# Patient Record
Sex: FEMALE | Race: BLACK OR AFRICAN AMERICAN | NOT HISPANIC OR LATINO | Employment: FULL TIME | URBAN - METROPOLITAN AREA
[De-identification: names, ages, dates, MRNs, and addresses within clinical notes are randomized per-mention and may not be internally consistent; named-entity substitution may affect disease eponyms.]

---

## 2024-01-22 ENCOUNTER — HOSPITAL ENCOUNTER (EMERGENCY)
Facility: HOSPITAL | Age: 34
Discharge: HOME | End: 2024-01-22
Payer: COMMERCIAL

## 2024-01-22 VITALS
BODY MASS INDEX: 26.8 KG/M2 | WEIGHT: 157 LBS | RESPIRATION RATE: 17 BRPM | TEMPERATURE: 97.6 F | HEIGHT: 64 IN | SYSTOLIC BLOOD PRESSURE: 115 MMHG | HEART RATE: 65 BPM | DIASTOLIC BLOOD PRESSURE: 81 MMHG | OXYGEN SATURATION: 100 %

## 2024-01-22 DIAGNOSIS — R22.1 NECK MASS: Primary | ICD-10-CM

## 2024-01-22 PROCEDURE — 99283 EMERGENCY DEPT VISIT LOW MDM: CPT

## 2024-01-22 ASSESSMENT — COLUMBIA-SUICIDE SEVERITY RATING SCALE - C-SSRS
1. IN THE PAST MONTH, HAVE YOU WISHED YOU WERE DEAD OR WISHED YOU COULD GO TO SLEEP AND NOT WAKE UP?: NO
2. HAVE YOU ACTUALLY HAD ANY THOUGHTS OF KILLING YOURSELF?: NO
6. HAVE YOU EVER DONE ANYTHING, STARTED TO DO ANYTHING, OR PREPARED TO DO ANYTHING TO END YOUR LIFE?: NO

## 2024-01-22 ASSESSMENT — PAIN SCALES - GENERAL: PAINLEVEL_OUTOF10: 2

## 2024-01-22 ASSESSMENT — PAIN DESCRIPTION - LOCATION: LOCATION: NECK

## 2024-01-22 ASSESSMENT — PAIN - FUNCTIONAL ASSESSMENT: PAIN_FUNCTIONAL_ASSESSMENT: 0-10

## 2024-01-22 NOTE — ED TRIAGE NOTES
C/O LUMP ON LEFT SIDE OF NECK, ONSET 1 MONTH, PT WAS PRESCRIBED PREDNISONE AND AMOXICILLIN ON JAN 9TH, PT FINISHED PREDNISONE, BUT STATES SHE STOPPED TAKING THE AMOXICILLIN AFTER 7 DAYS

## 2024-01-22 NOTE — DISCHARGE INSTRUCTIONS
Please follow with ENT for a definitive diagnosis of the mass. Call Dr Dominique. A  may also contact you.

## 2024-01-23 NOTE — ED PROVIDER NOTES
HPI   Chief Complaint   Patient presents with    Mass       33-year-old female complains of painless mass on the left side of her neck states she was recently on antibiotics and steroids she did have discomfort after stretching however after taking these meds she continues to have tenderness and swelling.  Nothing makes her better or worse.  No problems swallowing or breathing.                          No data recorded                Patient History   Past Medical History:   Diagnosis Date    Abnormal glucose complicating pregnancy 03/05/2020    Glucose intolerance of pregnancy    Chlamydial infection, unspecified 08/01/2016    Chlamydia infection    Encounter for initial prescription of contraceptive pills 07/31/2018    Encounter for initial prescription of contraceptive pills    Encounter for pregnancy test, result positive 10/14/2019    Positive pregnancy test    Encounter for pregnancy test, result unknown 10/14/2019    Visit for confirmation of pregnancy test result with physical exam    Encounter for screening for infections with a predominantly sexual mode of transmission     Screening for STDs (sexually transmitted diseases)    Encounter for screening for infections with a predominantly sexual mode of transmission 08/17/2016    Routine screening for STI (sexually transmitted infection)    Encounter for screening for malignant neoplasm of cervix 05/24/2017    Encounter for Papanicolaou smear for cervical cancer screening    Encounter for supervision of normal pregnancy, unspecified, second trimester 02/18/2020    Second trimester pregnancy    Encounter for supervision of normal pregnancy, unspecified, third trimester 05/05/2020    Third trimester pregnancy    Maternal care for other (suspected) fetal abnormality and damage, fetal cardiac anomalies, not applicable or unspecified 05/11/2018    Abnormal fetal echocardiogram affecting antepartum care of mother    Other conditions influencing health status  02/18/2020    History of pregnancy    Other conditions influencing health status 05/11/2018    Nuchal fold thickening determined by ultrasound    Other specified health status 05/11/2018    No pertinent past medical history    Pelvic and perineal pain 07/22/2016    Pelvic pain in female    Personal history of other infectious and parasitic diseases 08/01/2016    History of trichomonal vaginitis    Vomiting of pregnancy, unspecified 01/31/2018    Nausea and vomiting of pregnancy, antepartum     No past surgical history on file.  No family history on file.  Social History     Tobacco Use    Smoking status: Not on file    Smokeless tobacco: Not on file   Substance Use Topics    Alcohol use: Not on file    Drug use: Not on file       Physical Exam   ED Triage Vitals [01/22/24 0948]   Temp Heart Rate Respirations BP   36.4 °C (97.6 °F) 65 17 115/81      Pulse Ox Temp src Heart Rate Source Patient Position   100 % -- -- --      BP Location FiO2 (%)     -- --       Physical Exam  Vitals and nursing note reviewed.   Constitutional:       General: She is not in acute distress.     Appearance: Normal appearance. She is normal weight. She is not ill-appearing, toxic-appearing or diaphoretic.   HENT:      Head: Normocephalic and atraumatic.      Right Ear: External ear normal.      Left Ear: External ear normal.      Nose: Nose normal.      Mouth/Throat:      Mouth: Mucous membranes are moist.   Eyes:      Extraocular Movements: Extraocular movements intact.      Conjunctiva/sclera: Conjunctivae normal.      Pupils: Pupils are equal, round, and reactive to light.   Cardiovascular:      Rate and Rhythm: Normal rate and regular rhythm.   Pulmonary:      Effort: Pulmonary effort is normal. No respiratory distress.      Breath sounds: No stridor.   Abdominal:      General: There is no distension.   Musculoskeletal:         General: No swelling, tenderness, deformity or signs of injury.      Cervical back: Normal range of motion.       Comments: Mobile enlarged mass to the left sternocleidomastoid region no enlarged lymph nodes appreciated.   Skin:     General: Skin is warm.      Capillary Refill: Capillary refill takes less than 2 seconds.      Findings: No rash.   Neurological:      General: No focal deficit present.      Mental Status: She is alert and oriented to person, place, and time. Mental status is at baseline.   Psychiatric:         Mood and Affect: Mood normal.         Behavior: Behavior normal.         Thought Content: Thought content normal.         Judgment: Judgment normal.         ED Course & MDM   Diagnoses as of 01/22/24 1958   Neck mass       Medical Decision Making  Differentials lymph node versus spasm versus mass versus lipoma recommend patient follow-up with primary care return for worse concerning symptoms referred to ENT for further evaluation and biopsy        Procedure  Procedures     Henry Baker PA-C  01/22/24 2000

## 2024-01-25 PROBLEM — R22.1 NECK MASS: Status: ACTIVE | Noted: 2024-01-25

## 2024-01-25 NOTE — PROGRESS NOTES
Chief Complaint: Left neck mass    Referring Provider: Henry Baker PA-C ED    33-year-old female complains of painless mass on the left side of her neck states she was recently on antibiotics and steroids she did have discomfort after stretching however after taking these meds she continues to have tenderness and swelling.  Referred to our office for surgical evaluation of left neck mass.     Location: ***  Quality: ***  Severity: ***  Duration: ***  Timing: ***  Context: ***  Modifying factors:  ***  Associated signs and symptoms: ***    Past Medical History  She has a past medical history of Abnormal glucose complicating pregnancy (03/05/2020), Chlamydial infection, unspecified (08/01/2016), Encounter for initial prescription of contraceptive pills (07/31/2018), Encounter for pregnancy test, result positive (10/14/2019), Encounter for pregnancy test, result unknown (10/14/2019), Encounter for screening for infections with a predominantly sexual mode of transmission, Encounter for screening for infections with a predominantly sexual mode of transmission (08/17/2016), Encounter for screening for malignant neoplasm of cervix (05/24/2017), Encounter for supervision of normal pregnancy, unspecified, second trimester (02/18/2020), Encounter for supervision of normal pregnancy, unspecified, third trimester (05/05/2020), Maternal care for other (suspected) fetal abnormality and damage, fetal cardiac anomalies, not applicable or unspecified (05/11/2018), Other conditions influencing health status (02/18/2020), Other conditions influencing health status (05/11/2018), Other specified health status (05/11/2018), Pelvic and perineal pain (07/22/2016), Personal history of other infectious and parasitic diseases (08/01/2016), and Vomiting of pregnancy, unspecified (01/31/2018).    Surgical History  She has no past surgical history on file.     Social History  She has no history on file for tobacco use, alcohol use, and drug  use.    Family History  No family history on file.     Allergies  Shellfish containing products    Past, family, and social history obtained but not pertinent to current problem unless documented above.    All other systems have been reviewed and are negative for complaint unless documented above.    Physical Exam:  General: Well-developed and well-nourished in appearance.  Skin: No rashes or concerning lesions on the visible portions of the skin.  Eyes: Extraocular movements intact. Visual fields grossly normal.  Ears: Pinna are normal in shape and position. External canals are patent.  Nose: Dorsum is midline. Septum is midline and turbinates are normal on anterior  rhinoscopy.  Oral Cavity/Oropharynx: Dentition is intact. Mucous membranes moist. No masses or  lesions. Tonsils are symmetric and non-obstructive.  Neck: Midline trachea without masses or lesions. Thyroid is normal in size.  Lymphatics: No palpable cervical lymphadenopathy  Respiratory: No respiratory distress. Quiet breathing without stertor or stridor.  Cardiovascular: Regular rate and rhythm. Warm extremities with equal pulses.  Psych: Normal mood and affect. Judgement and insight appropriate.  Neuro: Alert and oriented. CN II-XII grossly intact. No focal deficits.  Musculoskeletal: Gait intact. Moves all extremities well without apparent deformities.    Assessment: ***    Plan: ***

## 2024-01-29 ENCOUNTER — APPOINTMENT (OUTPATIENT)
Dept: OTOLARYNGOLOGY | Facility: CLINIC | Age: 34
End: 2024-01-29
Payer: COMMERCIAL

## 2024-10-25 ENCOUNTER — LAB (OUTPATIENT)
Dept: LAB | Facility: LAB | Age: 34
End: 2024-10-25
Payer: COMMERCIAL

## 2024-10-25 ENCOUNTER — OFFICE VISIT (OUTPATIENT)
Dept: PRIMARY CARE | Facility: CLINIC | Age: 34
End: 2024-10-25
Payer: COMMERCIAL

## 2024-10-25 VITALS
SYSTOLIC BLOOD PRESSURE: 116 MMHG | TEMPERATURE: 97.8 F | BODY MASS INDEX: 27.62 KG/M2 | HEIGHT: 64 IN | DIASTOLIC BLOOD PRESSURE: 77 MMHG | HEART RATE: 67 BPM | WEIGHT: 161.8 LBS

## 2024-10-25 DIAGNOSIS — Z76.89 ENCOUNTER TO ESTABLISH CARE WITH NEW DOCTOR: ICD-10-CM

## 2024-10-25 DIAGNOSIS — R22.1 NECK MASS: ICD-10-CM

## 2024-10-25 DIAGNOSIS — Z00.00 LABORATORY EXAMINATION ORDERED AS PART OF A ROUTINE GENERAL MEDICAL EXAMINATION: ICD-10-CM

## 2024-10-25 DIAGNOSIS — R22.1 NECK MASS: Primary | ICD-10-CM

## 2024-10-25 LAB
ALBUMIN SERPL BCP-MCNC: 4.2 G/DL (ref 3.4–5)
ALP SERPL-CCNC: 39 U/L (ref 33–110)
ALT SERPL W P-5'-P-CCNC: 19 U/L (ref 7–45)
ANION GAP SERPL CALC-SCNC: 11 MMOL/L (ref 10–20)
AST SERPL W P-5'-P-CCNC: 20 U/L (ref 9–39)
BASOPHILS # BLD AUTO: 0.04 X10*3/UL (ref 0–0.1)
BASOPHILS NFR BLD AUTO: 0.4 %
BILIRUB SERPL-MCNC: 0.6 MG/DL (ref 0–1.2)
BUN SERPL-MCNC: 11 MG/DL (ref 6–23)
CALCIUM SERPL-MCNC: 9.1 MG/DL (ref 8.6–10.3)
CHLORIDE SERPL-SCNC: 105 MMOL/L (ref 98–107)
CHOLEST SERPL-MCNC: 151 MG/DL (ref 0–199)
CHOLESTEROL/HDL RATIO: 2.6
CO2 SERPL-SCNC: 27 MMOL/L (ref 21–32)
CREAT SERPL-MCNC: 0.84 MG/DL (ref 0.5–1.05)
EGFRCR SERPLBLD CKD-EPI 2021: >90 ML/MIN/1.73M*2
EOSINOPHIL # BLD AUTO: 0.64 X10*3/UL (ref 0–0.7)
EOSINOPHIL NFR BLD AUTO: 5.6 %
ERYTHROCYTE [DISTWIDTH] IN BLOOD BY AUTOMATED COUNT: 13.7 % (ref 11.5–14.5)
GLUCOSE SERPL-MCNC: 84 MG/DL (ref 74–99)
HCT VFR BLD AUTO: 40.4 % (ref 36–46)
HDLC SERPL-MCNC: 59.1 MG/DL
HGB BLD-MCNC: 12.5 G/DL (ref 12–16)
IMM GRANULOCYTES # BLD AUTO: 0.03 X10*3/UL (ref 0–0.7)
IMM GRANULOCYTES NFR BLD AUTO: 0.3 % (ref 0–0.9)
LDLC SERPL CALC-MCNC: 72 MG/DL
LYMPHOCYTES # BLD AUTO: 2.16 X10*3/UL (ref 1.2–4.8)
LYMPHOCYTES NFR BLD AUTO: 19 %
MCH RBC QN AUTO: 26.4 PG (ref 26–34)
MCHC RBC AUTO-ENTMCNC: 30.9 G/DL (ref 32–36)
MCV RBC AUTO: 85 FL (ref 80–100)
MONOCYTES # BLD AUTO: 0.84 X10*3/UL (ref 0.1–1)
MONOCYTES NFR BLD AUTO: 7.4 %
NEUTROPHILS # BLD AUTO: 7.65 X10*3/UL (ref 1.2–7.7)
NEUTROPHILS NFR BLD AUTO: 67.3 %
NON HDL CHOLESTEROL: 92 MG/DL (ref 0–149)
NRBC BLD-RTO: 0 /100 WBCS (ref 0–0)
PLATELET # BLD AUTO: 289 X10*3/UL (ref 150–450)
POTASSIUM SERPL-SCNC: 4.3 MMOL/L (ref 3.5–5.3)
PROT SERPL-MCNC: 7.3 G/DL (ref 6.4–8.2)
RBC # BLD AUTO: 4.73 X10*6/UL (ref 4–5.2)
SODIUM SERPL-SCNC: 139 MMOL/L (ref 136–145)
TRIGL SERPL-MCNC: 100 MG/DL (ref 0–149)
VLDL: 20 MG/DL (ref 0–40)
WBC # BLD AUTO: 11.4 X10*3/UL (ref 4.4–11.3)

## 2024-10-25 PROCEDURE — 99204 OFFICE O/P NEW MOD 45 MIN: CPT | Performed by: STUDENT IN AN ORGANIZED HEALTH CARE EDUCATION/TRAINING PROGRAM

## 2024-10-25 PROCEDURE — 1036F TOBACCO NON-USER: CPT | Performed by: STUDENT IN AN ORGANIZED HEALTH CARE EDUCATION/TRAINING PROGRAM

## 2024-10-25 PROCEDURE — 85025 COMPLETE CBC W/AUTO DIFF WBC: CPT

## 2024-10-25 PROCEDURE — 80053 COMPREHEN METABOLIC PANEL: CPT

## 2024-10-25 PROCEDURE — 3008F BODY MASS INDEX DOCD: CPT | Performed by: STUDENT IN AN ORGANIZED HEALTH CARE EDUCATION/TRAINING PROGRAM

## 2024-10-25 PROCEDURE — 80061 LIPID PANEL: CPT

## 2024-10-25 PROCEDURE — 36415 COLL VENOUS BLD VENIPUNCTURE: CPT

## 2024-10-25 RX ORDER — BISMUTH SUBSALICYLATE 262 MG
1 TABLET,CHEWABLE ORAL DAILY
COMMUNITY

## 2024-10-25 ASSESSMENT — ENCOUNTER SYMPTOMS
LOSS OF SENSATION IN FEET: 0
DIZZINESS: 0
CONSTIPATION: 0
CHILLS: 0
NAUSEA: 0
OCCASIONAL FEELINGS OF UNSTEADINESS: 0
DIFFICULTY URINATING: 0
COUGH: 0
HEADACHES: 0
VOMITING: 0
WHEEZING: 0
ADENOPATHY: 0
FATIGUE: 0
ABDOMINAL PAIN: 0
COLOR CHANGE: 0
SHORTNESS OF BREATH: 0
DEPRESSION: 0
FEVER: 0
DIARRHEA: 0

## 2024-10-25 NOTE — PROGRESS NOTES
"  [facility] - [facility]  [address], [address]  [place] [address]    Subjective     Patient ID: Harini Pack is a 34 y.o. female who presents for  Establish Care (Lump on eft side of neck  and in the back of her head)     Lumps on head and neck. On the left side. For 8 months duration. Dont hurt. Not hard, squishy feeling. Some itching. Did go to the ER for this back then too. Was given a prescription and finished it. Nothing changed. Is not growing.     Was told by ER it was a lipoma. They did not do labs.        Review of Systems   Constitutional:  Negative for chills, fatigue and fever.   HENT:  Negative for congestion.    Eyes:  Negative for visual disturbance.   Respiratory:  Negative for cough, shortness of breath and wheezing.    Cardiovascular:  Negative for chest pain.   Gastrointestinal:  Negative for abdominal pain, constipation, diarrhea, nausea and vomiting.   Genitourinary:  Negative for difficulty urinating.   Musculoskeletal:  Negative for gait problem.   Skin:  Negative for color change.   Neurological:  Negative for dizziness, syncope and headaches.   Hematological:  Negative for adenopathy.       Social History     Tobacco Use    Smoking status: Never    Smokeless tobacco: Never   Substance Use Topics    Alcohol use: Yes     Alcohol/week: 2.0 standard drinks of alcohol     Types: 2 Glasses of wine per week    Drug use: Never     Family History   Problem Relation Name Age of Onset    Other (urethral cancer) Mother      Diabetes type II Father      Other (lipoma) Father       Allergies   Allergen Reactions    Shellfish Containing Products Unknown       /77 (BP Location: Right arm, Patient Position: Sitting, BP Cuff Size: Adult)   Pulse 67   Temp 36.6 °C (97.8 °F) (Temporal)   Ht 1.626 m (5' 4\")   Wt 73.4 kg (161 lb 12.8 oz)   BMI 27.77 kg/m²      Objective   Physical Exam  Vitals and nursing note reviewed.   Constitutional:       Appearance: Normal appearance. "   Eyes:      Extraocular Movements: Extraocular movements intact.      Conjunctiva/sclera: Conjunctivae normal.      Pupils: Pupils are equal, round, and reactive to light.   Neck:      Comments: One lump on the posterior-lateral aspect of the neck on the left side, soft but indurated, 2 cm in diameter. No overlying skin changes. Similar finding on the posterior scalp on the left, soft, 1.5 cm in diameter. No overlying hair or skin changes. Cervical anterior chain, mandibular, and submandibular were without lymphadenopathy. Supraclavicular without lymphadenopathy. Axillary without lymphadenopathy.  Cardiovascular:      Rate and Rhythm: Normal rate and regular rhythm.   Pulmonary:      Effort: Pulmonary effort is normal.      Breath sounds: Normal breath sounds.   Abdominal:      General: Abdomen is flat. Bowel sounds are normal.      Palpations: Abdomen is soft.   Musculoskeletal:         General: Normal range of motion.      Cervical back: Normal range of motion and neck supple.   Skin:     General: Skin is warm and dry.      Capillary Refill: Capillary refill takes less than 2 seconds.   Neurological:      General: No focal deficit present.      Mental Status: She is alert and oriented to person, place, and time.   Psychiatric:         Mood and Affect: Mood normal.         Behavior: Behavior normal.         Thought Content: Thought content normal.           Labs:   Lab Results   Component Value Date    WBC 10.2 04/01/2021    HGB 12.3 04/01/2021    HCT 38.4 04/01/2021     04/01/2021    INR 1.2 (H) 07/22/2018     Lab Results   Component Value Date     04/01/2021    K 3.8 04/01/2021     04/01/2021    BUN 8 04/01/2021    CREATININE 0.72 04/01/2021    GLUCOSE 95 04/01/2021    CALCIUM 8.8 04/01/2021    PROT 6.3 (L) 05/31/2018    BILITOT 0.3 05/31/2018    ALKPHOS 43 05/31/2018    AST 17 05/31/2018    ALT 15 05/31/2018     Imaging/Testing: Electrocardiogram 12 Lead  Normal sinus rhythm  Normal ECG  No  previous ECGs available  Confirmed by YVON MOSES MD ([id number]) on 4/6/2021 7:12:37 AM    Assessment/Plan   Problem List Items Addressed This Visit       Neck mass - Primary    Relevant Orders    CBC and Auto Differential    Referral to ENT    Follow Up In Primary Care - Established     Other Visit Diagnoses       Encounter to establish care with new doctor        Laboratory examination ordered as part of a routine general medical examination        Relevant Orders    Comprehensive Metabolic Panel    Lipid Panel          Neck mass- acute issue, uncontrolled, initial encounter. Lipoma versus lymphatic. Not likely infection.will get CBC, if abnormal will get smear + US neck. Did refer to ENT as well.    As part of today's Preventative Visit, an age and gender-appropriate history and physical was performed, as documented below. Counseling and anticipatory guidance were performed, and risk factor reduction interventions (including [place] Preventative Services Task Force recommended screening tests) were utilized/ordered as outlined in the above Assessment and Plan. All patient medications were reviewed, and refilled if necessary. Patient and I discussed diet/nutrition, lifestyle modifications, safety, medication indications and side effects, and health goals.    Patient and I discussed diet/nutrition, lifestyle modifications, safety, medication indications and side effects, and health goals.    current treatment plan is effective, no change in therapy, orders and follow up as documented in EMR, repeat labs ordered prior to next appointment     Return visit in 3 month.           ERYN HOLLIDAY MD, [facility]  Department of Family Medicine of [facility] - Primary Care

## 2024-10-27 NOTE — PROGRESS NOTES
Subjective   Patient ID: Harini Pack is a 34 y.o. female who presents for No chief complaint on file..  HPI  This 34-year-old female was seen by primary care for complaints of left neck and occipital neck lumps.  That assessment was recently done and according to history the left-sided lesions were noted about 8 months ago.  There is no associated pain and has been no signs of infection.  She was initially seen by an emergency room visit at that time and was told that this was a lipoma.  According to records the posterior lateral aspect of the neck on the left revealed a soft nonindurated 2 cm lesion.  No signs of discomfort were noted to palpation.  Posterior scalp reveal a soft 1.5 cm lesion with.  No other abnormalities in the neck were noted.  No imaging studies have been done  Review of Systems  A 12 point ROS has been reviewed and are negative for complaint except what is stated in the history of present illness and/or past medical history as noted in the EMR    Past Medical History:   Diagnosis Date    Abnormal glucose complicating pregnancy ([facility]) 03/05/2020    Glucose intolerance of pregnancy    Chlamydial infection, unspecified 08/01/2016    Chlamydia infection    Encounter for initial prescription of contraceptive pills 07/31/2018    Encounter for initial prescription of contraceptive pills    Encounter for pregnancy test, result positive ([facility]) 10/14/2019    Positive pregnancy test    Encounter for pregnancy test, result unknown 10/14/2019    Visit for confirmation of pregnancy test result with physical exam    Encounter for screening for infections with a predominantly sexual mode of transmission     Screening for STDs (sexually transmitted diseases)    Encounter for screening for infections with a predominantly sexual mode of transmission 08/17/2016    Routine screening for STI (sexually transmitted infection)    Encounter for screening for malignant neoplasm of cervix 05/24/2017    Encounter  for Papanicolaou smear for cervical cancer screening    Encounter for supervision of normal pregnancy, unspecified, second trimester 02/18/2020    Second trimester pregnancy    Encounter for supervision of normal pregnancy, unspecified, third trimester 05/05/2020    Third trimester pregnancy    Maternal care for other (suspected) fetal abnormality and damage, fetal cardiac anomalies, not applicable or unspecified 05/11/2018    Abnormal fetal echocardiogram affecting antepartum care of mother    Other conditions influencing health status 02/18/2020    History of pregnancy    Other conditions influencing health status 05/11/2018    Nuchal fold thickening determined by ultrasound    Other specified health status 05/11/2018    No pertinent past medical history    Pelvic and perineal pain 07/22/2016    Pelvic pain in female    Personal history of other infectious and parasitic diseases 08/01/2016    History of trichomonal vaginitis    Vomiting of pregnancy, unspecified 01/31/2018    Nausea and vomiting of pregnancy, antepartum          Current Outpatient Medications:     multivitamin tablet, Take 1 tablet by mouth once daily., Disp: , Rfl:      No past surgical history on file.     Family History   Problem Relation Name Age of Onset    Other (urethral cancer) Mother      Diabetes type II Father      Other (lipoma) Father         Social History     Tobacco Use    Smoking status: Never    Smokeless tobacco: Never   Substance Use Topics    Alcohol use: Yes     Alcohol/week: 2.0 standard drinks of alcohol     Types: 2 Glasses of wine per week       Allergies   Allergen Reactions    Shellfish Containing Products Unknown       There were no vitals taken for this visit.    Objective   Physical Exam  Examination:    CONSTITUTIONAL: Alert, in no acute distress, normal pitch/clarity of voice, well-developed, well-nourished, cooperative.  HEAD/FACE: Normocephalic, atraumatic, no tenderness over the sinuses, facial strength and  movement symmetric.    SKIN: Good turgor, no rashes, no suspicious lesions, in the head and neck.    EYES: Both eyes have normal extraocular movements with no nystagmus, pupils are equal and reactive to light and accommodation, conjunctiva is clear.    EARS: Both ears are negative for external skin abnormalities, external auditory canals are without lesions or signs of inflammation, tympanic membranes are intact and are of normal color and texture, no effusions are seen, light reflexes normal, no mastoid tenderness is noted to palpation, objective hearing is intact.    NOSE: No external skin lesions are noted, nares are patent, septum is intact and noninflamed, nasal turbinates are normal in appearance, sinuses are nontender to palpation bilaterally, no internal lesions or polyps are noted, no discharge is noted.    OROPHARYNX/ORAL CAVITY: Mucous membranes of the oropharynx and the oral cavity proper are without lesions or ulcerations, tongue mobility is normal and no lesions are noted, gingiva and alveolar mucosa is intact without lesions, oral mucosa is moist, muscular movement of the palate and gag reflex are normal.    NASOPHARYNX: Mucous membranes are noninflamed and no secretions or lesions are noted.    LARYNX: No mucosal inflammation or exudates are noted, arytenoids are normal in appearance and mobility, false vocal cords are without lesions as is the remainder of the supraglottic larynx, true vocal folds are mobile without inflammation or obstructions and no masses or lesions are noted in the endolarynx.    NECK: No lymphadenopathy is palpated, neck is supple with full range of motion, thyroid is without swelling or tenderness, trachea is midline, no neck masses are noted.    Lymphatics: No cervical adenopathy or supraclavicular adenopathy noted to palpation.    HEART/VASCULAR: No jugular venous distention is noted, carotid pulsations are intact with a regular rate and rhythm noted,    PULMONARY: Good air  movement with normal inspiratory/expiratory effort is noted, no audible wheezing is appreciated.    NEUROLOGIC: Alert and oriented, cranial nerves are grossly intact, gait is normal, sensation in the head and neck is intact,    PSYCH: oriented to person, place and time, normal mood and affect.    EXTREMITIES: No motor dysfunction of the upper and lower extremity is noted.  Assessment/Plan   {Assess/PlanSmartLinks:44474}         Farhan Casillas DMD, MD 10/27/24 11:16 AM

## 2024-10-28 ENCOUNTER — APPOINTMENT (OUTPATIENT)
Dept: OTOLARYNGOLOGY | Facility: CLINIC | Age: 34
End: 2024-10-28
Payer: COMMERCIAL

## 2024-10-28 DIAGNOSIS — R22.1 NECK MASS: Primary | ICD-10-CM

## 2024-11-11 ENCOUNTER — APPOINTMENT (OUTPATIENT)
Dept: OTOLARYNGOLOGY | Facility: CLINIC | Age: 34
End: 2024-11-11
Payer: COMMERCIAL

## 2024-12-05 ENCOUNTER — HOSPITAL ENCOUNTER (OUTPATIENT)
Dept: RADIOLOGY | Facility: HOSPITAL | Age: 34
Discharge: HOME | End: 2024-12-05
Payer: COMMERCIAL

## 2024-12-05 DIAGNOSIS — R22.1 NECK MASS: ICD-10-CM

## 2024-12-05 PROCEDURE — 76536 US EXAM OF HEAD AND NECK: CPT

## 2024-12-05 PROCEDURE — 76536 US EXAM OF HEAD AND NECK: CPT | Performed by: RADIOLOGY

## 2024-12-09 DIAGNOSIS — R22.1 NECK MASS: Primary | ICD-10-CM

## 2024-12-09 DIAGNOSIS — R22.0 HEAD MASS: ICD-10-CM

## 2024-12-18 ENCOUNTER — TELEPHONE (OUTPATIENT)
Dept: PRIMARY CARE | Facility: CLINIC | Age: 34
End: 2024-12-18
Payer: COMMERCIAL

## 2024-12-18 NOTE — TELEPHONE ENCOUNTER
I received a referral for a PEER TO PEER discussion for her CT head and CT neck for her neck mass. I called the phone number for her insurance [employer] at [phone]  and was directed to the confidential Peer to peer line. There was no answer, I left my call back number so they can reach me to conduct the peer to peer.

## 2024-12-20 ENCOUNTER — APPOINTMENT (OUTPATIENT)
Dept: PRIMARY CARE | Facility: CLINIC | Age: 34
End: 2024-12-20
Payer: COMMERCIAL

## 2024-12-23 ENCOUNTER — HOSPITAL ENCOUNTER (OUTPATIENT)
Dept: RADIOLOGY | Facility: CLINIC | Age: 34
Discharge: HOME | End: 2024-12-23
Payer: COMMERCIAL

## 2024-12-23 DIAGNOSIS — R22.1 NECK MASS: ICD-10-CM

## 2024-12-23 DIAGNOSIS — R22.0 HEAD MASS: ICD-10-CM

## 2024-12-23 PROCEDURE — 70491 CT SOFT TISSUE NECK W/DYE: CPT | Performed by: RADIOLOGY

## 2024-12-23 PROCEDURE — 70491 CT SOFT TISSUE NECK W/DYE: CPT

## 2024-12-23 PROCEDURE — 70460 CT HEAD/BRAIN W/DYE: CPT | Performed by: RADIOLOGY

## 2024-12-23 PROCEDURE — 70460 CT HEAD/BRAIN W/DYE: CPT

## 2024-12-23 PROCEDURE — 2550000001 HC RX 255 CONTRASTS: Performed by: STUDENT IN AN ORGANIZED HEALTH CARE EDUCATION/TRAINING PROGRAM

## 2025-01-28 ENCOUNTER — APPOINTMENT (OUTPATIENT)
Dept: PRIMARY CARE | Facility: CLINIC | Age: 35
End: 2025-01-28
Payer: COMMERCIAL

## 2025-01-28 VITALS
HEART RATE: 77 BPM | BODY MASS INDEX: 27.46 KG/M2 | OXYGEN SATURATION: 98 % | DIASTOLIC BLOOD PRESSURE: 75 MMHG | SYSTOLIC BLOOD PRESSURE: 111 MMHG | WEIGHT: 160 LBS

## 2025-01-28 DIAGNOSIS — D72.829 LEUKOCYTOSIS, UNSPECIFIED TYPE: Primary | ICD-10-CM

## 2025-01-28 DIAGNOSIS — R22.1 NECK MASS: ICD-10-CM

## 2025-01-28 PROCEDURE — 99214 OFFICE O/P EST MOD 30 MIN: CPT | Performed by: STUDENT IN AN ORGANIZED HEALTH CARE EDUCATION/TRAINING PROGRAM

## 2025-01-28 ASSESSMENT — ENCOUNTER SYMPTOMS
CHILLS: 0
FATIGUE: 0
SHORTNESS OF BREATH: 0
VOMITING: 0
CONSTIPATION: 0
COUGH: 0
DIARRHEA: 0
DIZZINESS: 0
ADENOPATHY: 0
COLOR CHANGE: 0
NAUSEA: 0
WHEEZING: 0
FEVER: 0
ABDOMINAL PAIN: 0
DIFFICULTY URINATING: 0
HEADACHES: 0

## 2025-01-28 NOTE — PROGRESS NOTES
[facility] - Primary [facility]  [address], [address]  [place] [address]    Subjective     Patient ID: Harini Pack is a 34 y.o. female who presents for  Follow-up     Follow-up for neck mass.  She had her CT scans, they showed reactive lymph nodes.  Had follow-up blood work to be completed but was not done.  Feels that the masses have decreased in size, mass on the head is decreased in size as well.  Is a little itchy.    No bodyaches, fevers, chills, weight loss, night sweats.    This issue has been ongoing for 1 year now.    Review of Systems   Constitutional:  Negative for chills, fatigue and fever.   HENT:  Negative for congestion.    Eyes:  Negative for visual disturbance.   Respiratory:  Negative for cough, shortness of breath and wheezing.    Cardiovascular:  Negative for chest pain.   Gastrointestinal:  Negative for abdominal pain, constipation, diarrhea, nausea and vomiting.   Genitourinary:  Negative for difficulty urinating.   Musculoskeletal:  Negative for gait problem.   Skin:  Negative for color change.   Neurological:  Negative for dizziness, syncope and headaches.   Hematological:  Negative for adenopathy.       Social History     Tobacco Use    Smoking status: Never    Smokeless tobacco: Never   Substance Use Topics    Alcohol use: Yes     Alcohol/week: 2.0 standard drinks of alcohol     Types: 2 Glasses of wine per week    Drug use: Never     Family History   Problem Relation Name Age of Onset    Other (urethral cancer) Mother      Diabetes type II Father      Other (lipoma) Father       Allergies   Allergen Reactions    Shellfish Containing Products Unknown       /75 (BP Location: Right arm, Patient Position: Sitting, BP Cuff Size: Adult)   Pulse 77   Wt 72.6 kg (160 lb)   SpO2 98%   BMI 27.46 kg/m²      Objective   Physical Exam  Vitals reviewed.   Constitutional:       Appearance: Normal appearance.   HENT:      Head:      Comments: Smaller lump on the scalp on the  "back, crown area. No excoriations noted.  Eyes:      Extraocular Movements: Extraocular movements intact.      Pupils: Pupils are equal, round, and reactive to light.   Cardiovascular:      Rate and Rhythm: Normal rate.      Pulses: Normal pulses.   Pulmonary:      Effort: Pulmonary effort is normal.   Musculoskeletal:         General: Normal range of motion.      Cervical back: Normal range of motion and neck supple. No tenderness.   Lymphadenopathy:      Cervical: Cervical adenopathy present.   Neurological:      General: No focal deficit present.      Mental Status: She is alert.   Psychiatric:         Mood and Affect: Mood normal.         Behavior: Behavior normal.           Labs:   Lab Results   Component Value Date    WBC 11.4 (H) 10/25/2024    HGB 12.5 10/25/2024    HCT 40.4 10/25/2024     10/25/2024    INR 1.2 (H) 07/22/2018     Lab Results   Component Value Date     10/25/2024    K 4.3 10/25/2024     10/25/2024    BUN 11 10/25/2024    CREATININE 0.84 10/25/2024    GLUCOSE 84 10/25/2024    CALCIUM 9.1 10/25/2024    PROT 7.3 10/25/2024    BILITOT 0.6 10/25/2024    ALKPHOS 39 10/25/2024    AST 20 10/25/2024    ALT 19 10/25/2024     Lab Results   Component Value Date    CHOL 151 10/25/2024      Lab Results   Component Value Date    TRIG 100 10/25/2024      Lab Results   Component Value Date    HDL 59.1 10/25/2024     Lab Results   Component Value Date    LDLCALC 72 10/25/2024      Lab Results   Component Value Date    VLDL 20 10/25/2024    No components found for: \"CHOLHDLRATI0\"    No data recorded    Imaging/Testing: CT soft tissue neck w IV contrast  Narrative: Interpreted By:  Jeremy Joel,   STUDY:  CT SOFT TISSUE NECK W IV CONTRAST;  12/23/2024 3:25 pm      INDICATION:  Signs/Symptoms:left side cervical lymphadeonpathy.      ,R22.1 Localized swelling, mass and lump, neck      COMPARISON:  Ultrasound of the neck from 12/05/2024.      ACCESSION NUMBER(S):  [id number]      ORDERING " CLINICIAN:  ERYN HOLLIDAY      TECHNIQUE:  Axial CT images of the neck were obtained.  The patient received 69  ML of Omnipaque 350 intravenous contrast agent. The images were  reformatted in angled axial, coronal and sagittal planes.      FINDINGS:  Oral Cavity, Pharynx and Larynx:  Evaluation oral cavity is limited  by streak artifact from dental hardware.  The nasopharyngeal and  oropharyngeal structures are unremarkable. The hypopharyngeal and  laryngeal structures are unremarkable.      Retropharyngeal and Prevertebral Soft Tissues: Unremarkable.      Lymph nodes: There are multiple enlarged level 3 lymph nodes and few  prominent left level 2 B, level 4, and supraclavicular lymph nodes.  The largest lymph node is located within the level 3 jn station.  There are 2 versus a single enlarged left level 3 lymph node which  measures 2.3 cm (series 201, image 66 of 129). There is edema within  the left neck subcutaneous fat adjacent to the enlarged lymph nodes.  There is no focal fluid collection to suggest an abscess. None of the  right cervical lymph nodes are enlarged.      Neck vessels: Bilateral neck vessels are normal in course and caliber  and appear patent.      Thyroid gland: There are subcentimeter nodules.      Parotid and submandibular glands: Bilateral parotid and submandibular  glands are unremarkable in appearance.      Paranasal Sinuses and Mastoids: There is a tiny mucosal retention  cyst located within the left maxillary sinus, otherwise the  visualized paranasal sinuses and mastoid air cells are essentially  clear.      Visualized orbital structures are unremarkable.      Visualized upper lungs are clear.      There is an unfused anterior osteophyte at the level of C4-C5. There  are additional mild osseous degenerative changes of the cervical  spine.      Impression: 1. Multiple enlarged left level 3 lymph nodes and prominent left  level 2, 4, and supraclavicular lymph nodes with edema within  the  surrounding subcutaneous fat. These lymph nodes may be reactive in  etiology, however underlying neoplastic process is not excluded.      2. No masses or other abnormalities are seen within the visualized  aerodigestive tract.      This study was interpreted at [facility].      MACRO:  None      Signed by: Jeremy Joel 12/23/2024 6:47 PM  Dictation workstation:   [id number]  CT head w IV contrast  Narrative: Interpreted By:  Shannan Mcwilliams and Dervishi Mario   STUDY:  CT HEAD W IV CONTRAST;  12/23/2024 3:25 pm      INDICATION:  Signs/Symptoms:cervical adenopathy and mass on scalp.      ,R22.1 Localized swelling, mass and lump, neck,R22.0 Localized  swelling, mass and lump, head      COMPARISON:  Ultrasound head: 12/05/2024. CT soft tissue neck 03/23/2024.      ACCESSION NUMBER(S):  [id number]      ORDERING CLINICIAN:  ERYN HOLLIDAY      TECHNIQUE:  After administration of 69 mL of intravenous iodinated contrast axial  CT scan of head was performed. Angled reformats in brain and bone  windows were generated. The images were reviewed in bone, brain,  blood and soft tissue windows.      FINDINGS:  CSF Spaces: The ventricles, sulci and basal cisterns are within  normal limits. There is no extraaxial fluid collection.      Parenchyma:  The grey-white differentiation is intact. There is no  mass effect or midline shift.  There is no intracranial hemorrhage.  No abnormal enhancing intracranial lesions.      Calvarium: The calvarium is intact.      Soft tissues: There is a hyperdense/enhancing left parietal  subcutaneous lesion measuring 2.9 x 0.9 cm (series 301, image 17).  There is a prominent enhancing suboccipital node on the left which  measures 11 x 7 mm in size.      Paranasal sinuses and mastoids: Visualized paranasal sinuses and  mastoids are clear.      Impression: 1.  No evidence of acute intracranial abnormality.  2. Left parietal subcutaneous hyperdense/enhancing  lesion,  incompletely characterized on current exam due to limitations of the  modality. This may be inflammatory, infectious or less likely  neoplastic in etiology. Enhancing left suboccipital node, likely  reactive.          I personally reviewed the images/study and I agree with the findings  as stated by Resident Adams Ji MD.      MACRO:  None      Signed by: Shannan Mcwilliams 12/23/2024 5:32 PM  Dictation workstation:   [id number]    Assessment/Plan   Problem List Items Addressed This Visit       Neck mass     Chronic, improved, initial.  Did have leukocytosis on last CBC.  Blood work that was ordered in follow-up was not completed, I reordered this.  Also ordered a peripheral smear to be completed.  Since these are lymph nodes, will refer her to hematology oncology instead of ENT.  She did cancel her ENT appointment that she was supposed to go to.         Relevant Orders    CBC and Auto Differential    TSH with reflex to Free T4 if abnormal    C-reactive protein    Sedimentation Rate    [place] PCR, Quant,Plasma    Slide Request    Referral To Hematology and Oncology    Leukocytosis - Primary     Plan as above.  Repeat CBC and complete a peripheral smear.         Relevant Orders    CBC and Auto Differential    Slide Request    Referral To Hematology and Oncology     Patient and I discussed diet/nutrition, lifestyle modifications, safety, medication indications and side effects, and health goals.    orders and follow up as documented in EMR, lab results reviewed with patient, repeat labs ordered prior to next appointment, reviewed compliance with lifestyle measures, reviewed diet, exercise and weight control, reviewed medications and side effects in detail     Return after complete lab work, to follow-up.    Schedule with hematology oncology, referral placed.           ERYN HOLLIDAY MD, [facility]  Department of Family Medicine of [facility] - Primary Care

## 2025-01-28 NOTE — ASSESSMENT & PLAN NOTE
Chronic, improved, initial.  Did have leukocytosis on last CBC.  Blood work that was ordered in follow-up was not completed, I reordered this.  Also ordered a peripheral smear to be completed.  Since these are lymph nodes, will refer her to hematology oncology instead of ENT.  She did cancel her ENT appointment that she was supposed to go to.

## 2025-01-30 LAB
BASOPHILS # BLD AUTO: 44 CELLS/UL (ref 0–200)
BASOPHILS NFR BLD AUTO: 0.5 %
CRP SERPL-MCNC: <3 MG/L
EBV DNA # SPEC NAA+PROBE: ABNORMAL COPIES/ML
EBV DNA SPEC NAA+PROBE-LOG#: ABNORMAL LOG CPS/ML
EOSINOPHIL # BLD AUTO: 740 CELLS/UL (ref 15–500)
EOSINOPHIL NFR BLD AUTO: 8.5 %
ERYTHROCYTE [DISTWIDTH] IN BLOOD BY AUTOMATED COUNT: 12.6 % (ref 11–15)
ERYTHROCYTE [SEDIMENTATION RATE] IN BLOOD BY WESTERGREN METHOD: 6 MM/H
HCT VFR BLD AUTO: 41.1 % (ref 35–45)
HGB BLD-MCNC: 13.1 G/DL (ref 11.7–15.5)
LYMPHOCYTES # BLD AUTO: 2584 CELLS/UL (ref 850–3900)
LYMPHOCYTES NFR BLD AUTO: 29.7 %
MCH RBC QN AUTO: 26.7 PG (ref 27–33)
MCHC RBC AUTO-ENTMCNC: 31.9 G/DL (ref 32–36)
MCV RBC AUTO: 83.9 FL (ref 80–100)
MONOCYTES # BLD AUTO: 687 CELLS/UL (ref 200–950)
MONOCYTES NFR BLD AUTO: 7.9 %
NEUTROPHILS # BLD AUTO: 4646 CELLS/UL (ref 1500–7800)
NEUTROPHILS NFR BLD AUTO: 53.4 %
PLATELET # BLD AUTO: 257 THOUSAND/UL (ref 140–400)
PMV BLD REES-ECKER: 10.4 FL (ref 7.5–12.5)
RBC # BLD AUTO: 4.9 MILLION/UL (ref 3.8–5.1)
SPECIMEN SOURCE: ABNORMAL
TSH SERPL-ACNC: 1.51 MIU/L
WBC # BLD AUTO: 8.7 THOUSAND/UL (ref 3.8–10.8)

## 2025-02-13 ENCOUNTER — TELEPHONE (OUTPATIENT)
Dept: HEMATOLOGY/ONCOLOGY | Facility: HOSPITAL | Age: 35
End: 2025-02-13
Payer: COMMERCIAL

## 2025-02-13 ENCOUNTER — APPOINTMENT (OUTPATIENT)
Dept: HEMATOLOGY/ONCOLOGY | Facility: HOSPITAL | Age: 35
End: 2025-02-13
Payer: COMMERCIAL

## 2025-02-13 ENCOUNTER — DOCUMENTATION (OUTPATIENT)
Dept: HEMATOLOGY/ONCOLOGY | Facility: HOSPITAL | Age: 35
End: 2025-02-13
Payer: COMMERCIAL

## 2025-02-13 DIAGNOSIS — R59.1 LYMPHADENOPATHY: ICD-10-CM

## 2025-02-13 NOTE — PROGRESS NOTES
Referral placed to the [facility] by Francisco Saleh MD for LAD, discovered incidentally on CT imaging 12/23/2024. I called the patient and received her identified voicemail. I left a message with my name and contact number to return my call.     SURYA Staton.[facility]

## 2025-02-13 NOTE — TELEPHONE ENCOUNTER
Patient was called and told that because she has not been seen by the diagnostic clinic yet we were putting in a referral for her to be seen by that department so that she can be referred to the appropriate provider. The patient stated she had a CT scan done in December 2024 and the results should be in her Mychart. I let the patient know that although she had a CT scan and the results are in she would still need to be seen by diagnostics so that they can figure out what is going on. The patient was understanding and I Iet her know someone from diagnostics would reach out her. I told the patient I would cancel her appointment with doctor Saleh today. The patient had no further questions at this time.

## 2025-02-17 ENCOUNTER — DOCUMENTATION (OUTPATIENT)
Dept: HEMATOLOGY/ONCOLOGY | Facility: HOSPITAL | Age: 35
End: 2025-02-17
Payer: COMMERCIAL

## 2025-02-17 NOTE — PROGRESS NOTES
I again attempted to contact patient. Message left on her identified voicemail with my name and office number to return my call.     Anahy Kulkarni CNP  [facility]

## 2025-02-19 ENCOUNTER — DOCUMENTATION (OUTPATIENT)
Dept: HEMATOLOGY/ONCOLOGY | Facility: HOSPITAL | Age: 35
End: 2025-02-19
Payer: COMMERCIAL

## 2025-02-19 NOTE — LETTER
February 19, 2025     No Recipients    Patient: Harini GALO Ramone   YOB: 1990   Date of Visit: 2/19/2025     Dear Harini Pack,    I am contacting you to follow-up on a referral to the [facility] diagnostic clinic, which was placed by the team caring for you.    I have been unable to reach you via telephone. Please contact our office at [phone], to discuss this referral and next steps.    Kind regards,    SURYA Staton.[facility]

## 2025-02-19 NOTE — PROGRESS NOTES
I again attempted to reach patient today. I again left a voicemail on her identified voicemail with my name and office number to return my call. Given I have attempted to contact her multiple times without success, I have sent a letter to her address on file.    Anahy Kulkarni, DMITRI  [facility]

## 2025-02-28 ENCOUNTER — DOCUMENTATION (OUTPATIENT)
Dept: HEMATOLOGY/ONCOLOGY | Facility: CLINIC | Age: 35
End: 2025-02-28
Payer: COMMERCIAL

## 2025-02-28 NOTE — PROGRESS NOTES
Patient returned my call. Patient scheduled for a visit 03/07/2025. All appointment details given. Diagnostic clinic information provided again.     Anahy Kulkarni CNP  [facility]

## 2025-03-04 PROBLEM — N93.9 VAGINAL BLEEDING: Status: ACTIVE | Noted: 2025-03-04

## 2025-03-04 PROBLEM — J11.1 INFLUENZA: Status: RESOLVED | Noted: 2025-03-04 | Resolved: 2025-03-04

## 2025-03-04 PROBLEM — Z98.890 POST-OPERATIVE STATE: Status: ACTIVE | Noted: 2025-03-04

## 2025-03-04 PROBLEM — N93.9 VAGINAL SPOTTING: Status: ACTIVE | Noted: 2025-03-04

## 2025-03-04 PROBLEM — R05.9 COUGH: Status: ACTIVE | Noted: 2025-03-04

## 2025-03-04 PROBLEM — D56.3 ALPHA THALASSEMIA SILENT CARRIER: Status: ACTIVE | Noted: 2025-03-04

## 2025-03-04 PROBLEM — R10.2 PELVIC PAIN IN FEMALE: Status: ACTIVE | Noted: 2025-03-04

## 2025-03-04 PROBLEM — K59.00 CONSTIPATION: Status: ACTIVE | Noted: 2025-03-04

## 2025-03-04 PROBLEM — N76.0 VAGINITIS: Status: ACTIVE | Noted: 2025-03-04

## 2025-03-04 PROBLEM — N92.6 IRREGULAR MENSTRUAL CYCLE: Status: ACTIVE | Noted: 2025-03-04

## 2025-03-04 PROBLEM — N39.0 UTI (URINARY TRACT INFECTION): Status: ACTIVE | Noted: 2025-03-04

## 2025-03-04 PROBLEM — J06.9 UPPER RESPIRATORY TRACT INFECTION: Status: ACTIVE | Noted: 2025-03-04

## 2025-03-04 RX ORDER — NORETHINDRONE AND ETHINYL ESTRADIOL 1; .035 MG/1; MG/1
1 TABLET ORAL DAILY
COMMUNITY
Start: 2021-10-18

## 2025-03-04 RX ORDER — LORATADINE 10 MG/1
1 TABLET ORAL DAILY
COMMUNITY
Start: 2022-04-17

## 2025-03-04 RX ORDER — DOXYLAMINE SUCCINATE AND PYRIDOXINE HYDROCHLORIDE, DELAYED RELEASE TABLETS 10 MG/10 MG 10; 10 MG/1; MG/1
2 TABLET, DELAYED RELEASE ORAL NIGHTLY
COMMUNITY

## 2025-03-04 RX ORDER — DOCUSATE SODIUM 100 MG/1
100 CAPSULE, LIQUID FILLED ORAL 2 TIMES DAILY
COMMUNITY
Start: 2020-05-12

## 2025-03-07 ENCOUNTER — APPOINTMENT (OUTPATIENT)
Dept: HEMATOLOGY/ONCOLOGY | Facility: CLINIC | Age: 35
End: 2025-03-07
Payer: COMMERCIAL

## 2025-05-02 ENCOUNTER — OFFICE VISIT (OUTPATIENT)
Dept: OBSTETRICS AND GYNECOLOGY | Facility: CLINIC | Age: 35
End: 2025-05-02
Payer: COMMERCIAL

## 2025-05-02 VITALS
BODY MASS INDEX: 28.92 KG/M2 | DIASTOLIC BLOOD PRESSURE: 63 MMHG | WEIGHT: 163.2 LBS | HEIGHT: 63 IN | SYSTOLIC BLOOD PRESSURE: 99 MMHG

## 2025-05-02 DIAGNOSIS — O20.0 THREATENED ABORTION, ANTEPARTUM CONDITION OR COMPLICATION (HHS-HCC): Primary | ICD-10-CM

## 2025-05-02 PROCEDURE — 3008F BODY MASS INDEX DOCD: CPT | Performed by: OBSTETRICS & GYNECOLOGY

## 2025-05-02 PROCEDURE — 99204 OFFICE O/P NEW MOD 45 MIN: CPT | Performed by: OBSTETRICS & GYNECOLOGY

## 2025-05-02 PROCEDURE — 1036F TOBACCO NON-USER: CPT | Performed by: OBSTETRICS & GYNECOLOGY

## 2025-05-02 PROCEDURE — 99214 OFFICE O/P EST MOD 30 MIN: CPT | Performed by: OBSTETRICS & GYNECOLOGY

## 2025-05-02 ASSESSMENT — PAIN SCALES - GENERAL: PAINLEVEL_OUTOF10: 0-NO PAIN

## 2025-05-02 NOTE — PROGRESS NOTES
Subjective   Patient ID: Harini Pack is a 35 y.o. female who presents for Follow-up (Pt is here for a follow up appointment. Pt stated that she has a miscarriage 3 days ago and they told her to follow today./No pain/No fall /Pt declined chaperone. /[facility]).  HPI  Patient referred from the emergency room and from her primary care physician.  Patient was seen in the emergency room on April 29.  Patient was having bleeding which appeared to be like a period.  Minimal cramping.  This was a proper time for her cycle.  She has not missed any periods.  Patient notes she had a positive urine pregnancy test at home.  Was not attempting pregnancy but recently had an IUD removed.  Quantitative hCG in the emergency room was 7.3 with a negative ultrasound.  No ectopic or intrauterine pregnancy noted.  Patient has continued to have what she looks like a normal period for her.    Review of Systems  All systems negative    Objective   Physical Exam  Vitals reviewed.   Constitutional:       Appearance: Normal appearance. She is normal weight.   Cardiovascular:      Rate and Rhythm: Normal rate and regular rhythm.   Pulmonary:      Effort: Pulmonary effort is normal.      Breath sounds: Normal breath sounds.   Abdominal:      General: Abdomen is flat. Bowel sounds are normal. There is no distension.      Palpations: Abdomen is soft. There is no mass.      Tenderness: There is no abdominal tenderness.   Genitourinary:     Comments: External genitalia unremarkable  Vagina clear except for some minimal bleeding appears menstrual  Cervix closed uterus normal anteverted nontender  Adnexa masses or tenderness  Perineum without lesions or swelling  Musculoskeletal:      Cervical back: Normal range of motion and neck supple.   Neurological:      General: No focal deficit present.      Mental Status: She is alert.   Psychiatric:         Mood and Affect: Mood normal.         Behavior: Behavior normal.         Thought Content: Thought  content normal.         Judgment: Judgment normal.     Assessment/Plan   Diagnoses and all orders for this visit:  Threatened , antepartum condition or complication ([facility])  -     Human Chorionic Gonadotropin, Serum Quantitative; Future  Reviewed.  Patient with a very low quantitative hCG is 7.3 with a reported home pregnancy test positive.  Current ultrasound reviewed shows no intrauterine gestation or adnexal issues.  Discussed the possibility of a completed spontaneous  versus a chemical pregnancy.  Also may not have ever been pregnant but the level is low and a negative ultrasound.  Will plan on doing serial hCGs to reassess pregnancy if present.  Will currently diagnosis threatened .  Reassess after results are back.  Patient return to office in 1 week.       Jose De Jesus Arriaga MD 25 11:02 AM

## 2025-05-03 LAB — B-HCG SERPL-ACNC: <5 MIU/ML
